# Patient Record
Sex: FEMALE | Race: WHITE | NOT HISPANIC OR LATINO
[De-identification: names, ages, dates, MRNs, and addresses within clinical notes are randomized per-mention and may not be internally consistent; named-entity substitution may affect disease eponyms.]

---

## 2022-02-07 PROBLEM — Z00.00 ENCOUNTER FOR PREVENTIVE HEALTH EXAMINATION: Status: ACTIVE | Noted: 2022-02-07

## 2022-02-09 ENCOUNTER — APPOINTMENT (OUTPATIENT)
Dept: GASTROENTEROLOGY | Facility: CLINIC | Age: 72
End: 2022-02-09
Payer: MEDICARE

## 2022-02-09 VITALS
TEMPERATURE: 97.1 F | HEART RATE: 72 BPM | HEIGHT: 60.75 IN | SYSTOLIC BLOOD PRESSURE: 108 MMHG | DIASTOLIC BLOOD PRESSURE: 68 MMHG | BODY MASS INDEX: 18.94 KG/M2 | WEIGHT: 99 LBS

## 2022-02-09 DIAGNOSIS — Z87.891 PERSONAL HISTORY OF NICOTINE DEPENDENCE: ICD-10-CM

## 2022-02-09 DIAGNOSIS — Z82.49 FAMILY HISTORY OF ISCHEMIC HEART DISEASE AND OTHER DISEASES OF THE CIRCULATORY SYSTEM: ICD-10-CM

## 2022-02-09 DIAGNOSIS — Z72.89 OTHER PROBLEMS RELATED TO LIFESTYLE: ICD-10-CM

## 2022-02-09 DIAGNOSIS — Z12.11 ENCOUNTER FOR SCREENING FOR MALIGNANT NEOPLASM OF COLON: ICD-10-CM

## 2022-02-09 PROCEDURE — 99203 OFFICE O/P NEW LOW 30 MIN: CPT

## 2022-02-09 RX ORDER — PSYLLIUM HUSK 0.4 G
CAPSULE ORAL
Refills: 0 | Status: ACTIVE | COMMUNITY

## 2022-02-09 RX ORDER — LEFLUNOMIDE 20 MG/1
20 TABLET, FILM COATED ORAL
Refills: 0 | Status: ACTIVE | COMMUNITY

## 2022-02-09 RX ORDER — HYDROXYCHLOROQUINE SULFATE 200 MG/1
200 TABLET ORAL
Refills: 0 | Status: ACTIVE | COMMUNITY

## 2022-02-09 RX ORDER — CHROMIUM 200 MCG
TABLET ORAL
Refills: 0 | Status: ACTIVE | COMMUNITY

## 2022-02-09 RX ORDER — UBIQUINOL 100 MG
CAPSULE ORAL
Refills: 0 | Status: ACTIVE | COMMUNITY

## 2022-02-09 NOTE — ASSESSMENT
[FreeTextEntry1] : 1.  Colon cancer screening:  colonoscopy scheduled\par \par 2. GERD:  EGD scheduled to exclude Barretts\par \par 3. Weight loss:  Etiology unclear.  EGD  / colonoscopy scheduled...if unremarkable , would  consider CT scan\par \par Risks of the procedures including but not limited to bleeding / perforation / infection / anesthesia complication / missed  lesions explained to the  patient . The patient expressed understanding and a desire to proceed with the procedures.\par \par Risk of not doing procedures includes but is not limited to missed or delayed diagnosis of gastric pathology, colon cancer or other gastrointestinal pathology\par \par Pertinent available records reviewed\par

## 2022-02-09 NOTE — CONSULT LETTER
[Dear  ___] : Dear  [unfilled], [Consult Letter:] : I had the pleasure of evaluating your patient, [unfilled]. [Please see my note below.] : Please see my note below. [Consult Closing:] : Thank you very much for allowing me to participate in the care of this patient.  If you have any questions, please do not hesitate to contact me. [Sincerely,] : Sincerely, [FreeTextEntry3] : Eliceo Real MD\par tel: [phone]\par fax: [phone]\par

## 2022-02-09 NOTE — HISTORY OF PRESENT ILLNESS
[de-identified] : BLAIR COBOS  is being evaluated at the request of Dr. JEET Leigh  for an opinion re:  colon cancer  screening / occasional GERD and  unintentional 15 pound weight loss since  initiation of  COVID  pandemic.  Denies  nausea, vomiting, fever, chills,diarrhea, constipation, melena, hematemesis, BRBPR\par GERD is controlled with occasional Omeprazole

## 2022-03-15 ENCOUNTER — APPOINTMENT (OUTPATIENT)
Dept: GASTROENTEROLOGY | Facility: HOSPITAL | Age: 72
End: 2022-03-15

## 2022-05-17 ENCOUNTER — RESULT REVIEW (OUTPATIENT)
Age: 72
End: 2022-05-17

## 2022-05-19 ENCOUNTER — RESULT REVIEW (OUTPATIENT)
Age: 72
End: 2022-05-19

## 2022-05-20 ENCOUNTER — APPOINTMENT (OUTPATIENT)
Dept: GASTROENTEROLOGY | Facility: HOSPITAL | Age: 72
End: 2022-05-20

## 2022-05-20 DIAGNOSIS — K21.9 GASTRO-ESOPHAGEAL REFLUX DISEASE W/OUT ESOPHAGITIS: ICD-10-CM

## 2022-05-20 RX ORDER — OMEPRAZOLE 40 MG/1
40 CAPSULE, DELAYED RELEASE ORAL
Qty: 30 | Refills: 6 | Status: ACTIVE | COMMUNITY
Start: 2022-05-20 | End: 1900-01-01

## 2022-06-17 ENCOUNTER — RESULT REVIEW (OUTPATIENT)
Age: 72
End: 2022-06-17

## 2022-06-22 ENCOUNTER — NON-APPOINTMENT (OUTPATIENT)
Age: 72
End: 2022-06-22

## 2022-06-22 ENCOUNTER — APPOINTMENT (OUTPATIENT)
Dept: GASTROENTEROLOGY | Facility: CLINIC | Age: 72
End: 2022-06-22
Payer: MEDICARE

## 2022-06-22 DIAGNOSIS — R63.4 ABNORMAL WEIGHT LOSS: ICD-10-CM

## 2022-06-22 PROCEDURE — 36415 COLL VENOUS BLD VENIPUNCTURE: CPT

## 2022-06-23 LAB
AFP-TM SERPL-MCNC: 5.3 NG/ML
ALBUMIN SERPL ELPH-MCNC: 4.5 G/DL
ALP BLD-CCNC: 79 U/L
ALT SERPL-CCNC: 21 U/L
ANION GAP SERPL CALC-SCNC: 16 MMOL/L
AST SERPL-CCNC: 24 U/L
BASOPHILS # BLD AUTO: 0.05 K/UL
BASOPHILS NFR BLD AUTO: 0.6 %
BILIRUB DIRECT SERPL-MCNC: 0.1 MG/DL
BILIRUB INDIRECT SERPL-MCNC: 0.2 MG/DL
BILIRUB SERPL-MCNC: 0.4 MG/DL
BUN SERPL-MCNC: 18 MG/DL
CALCIUM SERPL-MCNC: 10.1 MG/DL
CANCER AG19-9 SERPL-ACNC: 26 U/ML
CHLORIDE SERPL-SCNC: 103 MMOL/L
CO2 SERPL-SCNC: 24 MMOL/L
CREAT SERPL-MCNC: 0.74 MG/DL
CRP SERPL-MCNC: <3 MG/L
EGFR: 86 ML/MIN/1.73M2
EOSINOPHIL # BLD AUTO: 0 K/UL
EOSINOPHIL NFR BLD AUTO: 0 %
ERYTHROCYTE [SEDIMENTATION RATE] IN BLOOD BY WESTERGREN METHOD: 25 MM/HR
FOLATE SERPL-MCNC: 7.3 NG/ML
GLUCOSE SERPL-MCNC: 98 MG/DL
HCT VFR BLD CALC: 42.3 %
HGB BLD-MCNC: 13.4 G/DL
IGA SER QL IEP: 168 MG/DL
IMM GRANULOCYTES NFR BLD AUTO: 0.5 %
INR PPP: 0.91 RATIO
LYMPHOCYTES # BLD AUTO: 1.23 K/UL
LYMPHOCYTES NFR BLD AUTO: 15.3 %
MAN DIFF?: NORMAL
MCHC RBC-ENTMCNC: 31.7 GM/DL
MCHC RBC-ENTMCNC: 31.9 PG
MCV RBC AUTO: 100.7 FL
MONOCYTES # BLD AUTO: 0.93 K/UL
MONOCYTES NFR BLD AUTO: 11.6 %
NEUTROPHILS # BLD AUTO: 5.78 K/UL
NEUTROPHILS NFR BLD AUTO: 72 %
PLATELET # BLD AUTO: 484 K/UL
POTASSIUM SERPL-SCNC: 5.1 MMOL/L
PROT SERPL-MCNC: 6.9 G/DL
PT BLD: 10.6 SEC
RBC # BLD: 4.2 M/UL
RBC # FLD: 13.5 %
SODIUM SERPL-SCNC: 144 MMOL/L
TSH SERPL-ACNC: 0.99 UIU/ML
VIT B12 SERPL-MCNC: 567 PG/ML
WBC # FLD AUTO: 8.03 K/UL

## 2022-08-13 ENCOUNTER — RESULT REVIEW (OUTPATIENT)
Age: 72
End: 2022-08-13

## 2022-08-16 ENCOUNTER — APPOINTMENT (OUTPATIENT)
Dept: GASTROENTEROLOGY | Facility: HOSPITAL | Age: 72
End: 2022-08-16

## 2022-08-19 ENCOUNTER — TRANSCRIPTION ENCOUNTER (OUTPATIENT)
Age: 72
End: 2022-08-19

## 2024-05-28 ENCOUNTER — NON-APPOINTMENT (OUTPATIENT)
Age: 74
End: 2024-05-28

## 2024-05-29 ENCOUNTER — APPOINTMENT (OUTPATIENT)
Dept: GASTROENTEROLOGY | Facility: CLINIC | Age: 74
End: 2024-05-29
Payer: MEDICARE

## 2024-05-29 VITALS
WEIGHT: 101 LBS | BODY MASS INDEX: 19.83 KG/M2 | HEIGHT: 60 IN | DIASTOLIC BLOOD PRESSURE: 86 MMHG | SYSTOLIC BLOOD PRESSURE: 140 MMHG | OXYGEN SATURATION: 99 % | HEART RATE: 88 BPM

## 2024-05-29 DIAGNOSIS — R10.9 UNSPECIFIED ABDOMINAL PAIN: ICD-10-CM

## 2024-05-29 PROCEDURE — G2211 COMPLEX E/M VISIT ADD ON: CPT

## 2024-05-29 PROCEDURE — 99214 OFFICE O/P EST MOD 30 MIN: CPT

## 2024-05-29 NOTE — PHYSICAL EXAM
[Alert] : alert [Sclera] : the sclera and conjunctiva were normal [No Respiratory Distress] : no respiratory distress [None] : no edema [Abdomen Soft] : soft [No CVA Tenderness] : no CVA  tenderness [Abnormal Walk] : normal gait [Normal Color / Pigmentation] : normal skin color and pigmentation [Cranial Nerves Intact] : cranial nerves 2-12 were intact [Oriented To Time, Place, And Person] : oriented to person, place, and time

## 2024-05-29 NOTE — HISTORY OF PRESENT ILLNESS
[FreeTextEntry1] : Presents with 2 episodes of sharp  / intense / epigastric pain radiating bilaterally to her back. Last episode was 1 1/2 months ago. Had an episode 4 months prior.   Currently painfree  EGD / EUS  ( Strein 2022) normal EGD / diffusely ecchogenic pancrea / normal PD, CBD, celiac axis.  Indication for EGD / EUS was:  CT  IMPRESSION: 7/2022 Low attenuation in the head, neck, and a portion of the body of the pancreas may be secondary tofatty replacement, with the possibility of mass not excluded. Follow-up MR is recommended for further evaluation in this regard. Apparent thickening of haustra in the right colon may be secondary to underdistention with contrast, with the possibly of infiltrative pathology not entirely excluded. This can also be reevaluated on follow-up MR, or, if clinically warranted, follow-up colonoscopy can be performed forfurther evaluation.  -EGD / Colon  5/2022 ( GERD / screening) : gastritis / esophagitis / colon polyp / hemorrhoids / diverticulosis ( 5 year follow up recommended)

## 2024-05-29 NOTE — ASSESSMENT
[FreeTextEntry1] : Etiology of 2 discreet episodes of abdominal pain  ( 4 months apart ) is unclear. No further attacks in 1-2 months.  Labs ./ imaging if occurs again  Pertinent available records reviewed